# Patient Record
Sex: FEMALE
[De-identification: names, ages, dates, MRNs, and addresses within clinical notes are randomized per-mention and may not be internally consistent; named-entity substitution may affect disease eponyms.]

---

## 2018-05-16 ENCOUNTER — HOSPITAL ENCOUNTER (EMERGENCY)
Dept: HOSPITAL 88 - ER | Age: 23
LOS: 1 days | Discharge: HOME | End: 2018-05-17
Payer: COMMERCIAL

## 2018-05-16 VITALS — BODY MASS INDEX: 37.28 KG/M2 | WEIGHT: 232 LBS | HEIGHT: 66 IN

## 2018-05-16 DIAGNOSIS — K64.8: ICD-10-CM

## 2018-05-16 DIAGNOSIS — K59.00: ICD-10-CM

## 2018-05-16 DIAGNOSIS — R10.9: Primary | ICD-10-CM

## 2018-05-16 PROCEDURE — 81025 URINE PREGNANCY TEST: CPT

## 2018-05-16 PROCEDURE — 74022 RADEX COMPL AQT ABD SERIES: CPT

## 2018-05-16 PROCEDURE — 81001 URINALYSIS AUTO W/SCOPE: CPT

## 2018-05-16 PROCEDURE — 99283 EMERGENCY DEPT VISIT LOW MDM: CPT

## 2018-05-17 VITALS — DIASTOLIC BLOOD PRESSURE: 58 MMHG | SYSTOLIC BLOOD PRESSURE: 109 MMHG

## 2018-05-17 LAB
BACTERIA URNS QL MICRO: (no result) /HPF
BILIRUB UR QL: NEGATIVE
CLARITY UR: (no result)
COLOR UR: YELLOW
DEPRECATED RBC URNS MANUAL-ACNC: (no result) /HPF (ref 0–5)
EPI CELLS URNS QL MICRO: (no result) /LPF
HCG UR QL: NEGATIVE
KETONES UR QL STRIP.AUTO: NEGATIVE
LEUKOCYTE ESTERASE UR QL STRIP.AUTO: (no result)
NITRITE UR QL STRIP.AUTO: NEGATIVE
PROT UR QL STRIP.AUTO: NEGATIVE
SP GR UR STRIP: 1.02 (ref 1.01–1.02)
UROBILINOGEN UR STRIP-MCNC: 0.2 MG/DL (ref 0.2–1)
WBC #/AREA URNS HPF: (no result) /HPF (ref 0–5)

## 2018-05-17 NOTE — DIAGNOSTIC IMAGING REPORT
EXAM:  ABDOMEN ACUTE SERIES W/PA CXR

DATE: 5/17/2018 12:07 AM  Time stamp on exam: 2:53 AM

INDICATION: Abdominal pain    

COMPARISON: None





FINDINGS:

LINES/TUBES: None



BOWEL PATTERN: No evidence for obstruction.



SOFT TISSUES: No abnormal calcifications. No mass effect.



LUNGS: The lungs are clear.



BONES: No acute findings.



IMPRESSION:

No acute intrathoracic abnormality.

No obstructive bowel gas pattern.





Signed by: Dr. Humza Morrison M.D. on 5/17/2018 4:00 AM